# Patient Record
Sex: MALE | Race: WHITE | Employment: FULL TIME | URBAN - METROPOLITAN AREA
[De-identification: names, ages, dates, MRNs, and addresses within clinical notes are randomized per-mention and may not be internally consistent; named-entity substitution may affect disease eponyms.]

---

## 2022-04-08 ENCOUNTER — HOSPITAL ENCOUNTER (EMERGENCY)
Age: 52
Discharge: HOME | End: 2022-04-08
Attending: EMERGENCY MEDICINE
Payer: COMMERCIAL

## 2022-04-08 VITALS
OXYGEN SATURATION: 100 % | WEIGHT: 170.86 LBS | BODY MASS INDEX: 25.89 KG/M2 | SYSTOLIC BLOOD PRESSURE: 169 MMHG | TEMPERATURE: 97.4 F | DIASTOLIC BLOOD PRESSURE: 124 MMHG | RESPIRATION RATE: 16 BRPM | HEART RATE: 92 BPM | HEIGHT: 68 IN

## 2022-04-08 DIAGNOSIS — K11.5 SIALOLITHIASIS: Primary | ICD-10-CM

## 2022-04-08 PROCEDURE — 99283 EMERGENCY DEPT VISIT LOW MDM: CPT

## 2022-04-08 RX ORDER — HYDROCODONE BITARTRATE AND ACETAMINOPHEN 5; 325 MG/1; MG/1
1 TABLET ORAL ONCE
Status: DISCONTINUED | OUTPATIENT
Start: 2022-04-08 | End: 2022-04-08 | Stop reason: HOSPADM

## 2022-04-08 RX ORDER — ACETAMINOPHEN 325 MG/1
650 TABLET ORAL ONCE
Status: DISCONTINUED | OUTPATIENT
Start: 2022-04-08 | End: 2022-04-08 | Stop reason: HOSPADM

## 2022-04-08 RX ORDER — HYDROCODONE BITARTRATE AND ACETAMINOPHEN 5; 325 MG/1; MG/1
1 TABLET ORAL
Qty: 10 TABLET | Refills: 0 | Status: SHIPPED | OUTPATIENT
Start: 2022-04-08 | End: 2022-04-11

## 2022-04-08 NOTE — ED PROVIDER NOTES
EMERGENCY DEPARTMENT HISTORY AND PHYSICAL EXAM      Date: 4/8/2022  Patient Name: Yudith Paul  Patient Age and Sex: 45 y.o. male     History of Presenting Illness     Chief Complaint   Patient presents with    Other     pt had saliva stone recently; pt saw ENT and was given augmentin after aspirating pus from salivary gland; pt having trouble eating due to having too much pain on left side of mouth; pain radiating to rest of face; pt able to control secretions. airway patent       History Provided By: Patient    HPI: Yudith Paul is a 48-year-old recent salivary gland stone presenting with oral pain. Patient reports 3 weeks ago he passed a salivary gland stone following an episode of pain in the left side of his mouth. He had worsening pain, saw an urgent care facility who started him on Augmentin and referred him to an ENT physician this past Monday. He saw ENT his physician following day who aspirated pus from his gland and discharged on further antibiotics and Toradol. He has been taking the Toradol reports he has had progressive worsening pain since then. He has significant pain with moving his tongue in any direction has had inability to eat solid foods secondary to pain. He denies fever chills nausea or vomiting. He is able to drink without difficulty. No change to his voice. Persistent swelling under his tongue however no swelling to the side of his face. Continue on this course of Augmentin for the next 10 days approximately. There are no other complaints, changes, or physical findings at this time. PCP: Andreea Oates MD    No current facility-administered medications on file prior to encounter. Current Outpatient Medications on File Prior to Encounter   Medication Sig Dispense Refill    benazepril (LOTENSIN) 20 mg tablet Take 20 mg by mouth daily.            Past History     Past Medical History:  Past Medical History:   Diagnosis Date    Asthma     Hypertension        Past Surgical History:  Past Surgical History:   Procedure Laterality Date    HX VASECTOMY         Family History:  No family history on file. Social History:  Social History     Tobacco Use    Smoking status: Never Smoker    Smokeless tobacco: Not on file   Substance Use Topics    Alcohol use: Not on file     Comment: rarely    Drug use: No       Allergies:  No Known Allergies      Review of Systems   Review of Systems   Constitutional: Negative for chills and fever. HENT: Negative for congestion and rhinorrhea. Positive for tongue swelling   Respiratory: Negative for shortness of breath. Cardiovascular: Negative for chest pain. Gastrointestinal: Negative for abdominal pain, diarrhea, nausea and vomiting. Genitourinary: Negative for dysuria and frequency. Musculoskeletal: Negative for myalgias. Skin: Negative for rash. Neurological: Negative for weakness and numbness. All other systems reviewed and are negative. Physical Exam   Physical Exam  Vitals and nursing note reviewed. HENT:      Right Ear: Tympanic membrane normal.      Left Ear: Tympanic membrane normal.      Mouth/Throat:      Mouth: Mucous membranes are moist.      Comments: Swelling to left sublingual gland. No submandibular swelling, left cervical lymphadenopathy which is minimally tender to palpation. No trismus, no pain with range of motion of head. No visible abscess  Eyes:      Conjunctiva/sclera: Conjunctivae normal.   Cardiovascular:      Rate and Rhythm: Normal rate and regular rhythm. Pulmonary:      Effort: Pulmonary effort is normal.   Abdominal:      General: Abdomen is flat. Musculoskeletal:         General: No deformity. Neurological:      Mental Status: He is alert and oriented to person, place, and time. Mental status is at baseline. Psychiatric:         Behavior: Behavior normal.         Thought Content:  Thought content normal.        Diagnostic Study Results     Labs -   No results found for this or any previous visit (from the past 12 hour(s)). Radiologic Studies -   No orders to display     CT Results  (Last 48 hours)    None        CXR Results  (Last 48 hours)    None            Medical Decision Making   I am the first provider for this patient. I reviewed the vital signs, available nursing notes, past medical history, past surgical history, family history and social history. Vital Signs-Reviewed the patient's vital signs. Patient Vitals for the past 12 hrs:   Temp Pulse Resp BP SpO2   04/08/22 2018 -- -- -- (!) 169/124 100 %   04/08/22 1748 97.4 °F (36.3 °C) 92 16 (!) 154/106 100 %       Records Reviewed: Nursing Notes and Old Medical Records    Provider Notes (Medical Decision Making):   Patient presenting with oral swelling in setting of known salivary gland stones    There is no evidence of deep space ENT infection, Blanca's angina, no trismus, no evidence of peritonsillar abscess. He is well-appearing. Will optimize pain medications, recommend continuing NSAIDs, salivary massage, hydration and sialagogues. Otherwise recommend follow-up with ENT for possible repeat aspiration. He is not clinically dehydrated on exam.    ED Course:   Initial assessment performed. The patients presenting problems have been discussed, and they are in agreement with the care plan formulated and outlined with them. I have encouraged them to ask questions as they arise throughout their visit. Critical Care Time:   0    Disposition:  Discharge Note:  The patient has been re-evaluated and is ready for discharge. Reviewed available results with patient. Counseled patient on diagnosis and care plan. Patient has expressed understanding, and all questions have been answered. Patient agrees with plan and agrees to follow up as recommended, or to return to the ED if their symptoms worsen. Discharge instructions have been provided and explained to the patient, along with reasons to return to the ED. PLAN:  Discharge Medication List as of 4/8/2022  8:18 PM      START taking these medications    Details   HYDROcodone-acetaminophen (Norco) 5-325 mg per tablet Take 1 Tablet by mouth every six (6) hours as needed for Pain for up to 3 days. Max Daily Amount: 4 Tablets., Normal, Disp-10 Tablet, R-0         CONTINUE these medications which have NOT CHANGED    Details   benazepril (LOTENSIN) 20 mg tablet Take 20 mg by mouth daily. Historical Med, 20 mg           2. Follow-up Information     Follow up With Specialties Details Why Contact Info    Elysa Shone, MD Otolaryngology Schedule an appointment as soon as possible for a visit   [address] [facility] [address]          3. Return to ED if worse     Diagnosis     Clinical Impression:   1. Sialolithiasis        Attestations:    Susan Mcgrath M.D. Please note that this dictation was completed with [employer], the computer voice recognition software. Quite often unanticipated grammatical, syntax, homophones, and other interpretive errors are inadvertently transcribed by the computer software. Please disregard these errors. Please excuse any errors that have escaped final proofreading. Thank you.

## 2022-04-09 NOTE — ED NOTES
Lizzie CURIEL reviewed discharge instructions with the patient. The patient verbalized understanding. All questions and concerns were addressed. The patient is discharged ambulatory with instructions and prescriptions in hand. Pt is alert and oriented x 4. Respirations are clear and unlabored.

## 2022-04-09 NOTE — DISCHARGE INSTRUCTIONS
Please use lemon drops, continue to massage the parotid gland. Take prescribed Toradol, Tylenol 650 mg and breakthrough hydrocodone Tylenol over the weekend. Stay well-hydrated, drink protein shakes to maintain oral intake of calories and follow-up with ENT physician as soon as possible for further evaluation, likely repeat procedure. If you develop fever, inability to open mouth, or significant worsening of pain, return the emergency department for further evaluation.

## 2023-05-18 RX ORDER — BENAZEPRIL HYDROCHLORIDE 20 MG/1
20 TABLET ORAL DAILY
COMMUNITY